# Patient Record
Sex: FEMALE
[De-identification: names, ages, dates, MRNs, and addresses within clinical notes are randomized per-mention and may not be internally consistent; named-entity substitution may affect disease eponyms.]

---

## 2019-06-18 ENCOUNTER — APPOINTMENT (OUTPATIENT)
Dept: NEUROSURGERY | Facility: CLINIC | Age: 54
End: 2019-06-18
Payer: COMMERCIAL

## 2019-06-18 DIAGNOSIS — G90.59 COMPLEX REGIONAL PAIN SYNDROME I OF OTHER SPECIFIED SITE: ICD-10-CM

## 2019-06-18 PROBLEM — Z00.00 ENCOUNTER FOR PREVENTIVE HEALTH EXAMINATION: Status: ACTIVE | Noted: 2019-06-18

## 2019-06-18 PROCEDURE — 99202 OFFICE O/P NEW SF 15 MIN: CPT

## 2019-06-19 PROBLEM — G90.59: Status: ACTIVE | Noted: 2019-06-19

## 2019-06-24 NOTE — HISTORY OF PRESENT ILLNESS
[de-identified] : This is a luis 53 year old female who more than two years ago underwent foot/ankle surgery. She subsequently developed CRPS of the left foot which severely limits her mobility. She underwent SCS trial  with Dr. rice using [employer] DRG electrodes. With the device she had over 70% improvement and was able to walk barefoot without assistive device for the first time in years. She is here to discuss permanent implantation.

## 2022-06-18 ENCOUNTER — FORM ENCOUNTER (OUTPATIENT)
Age: 57
End: 2022-06-18

## 2022-07-01 ENCOUNTER — APPOINTMENT (OUTPATIENT)
Dept: ORTHOPEDIC SURGERY | Facility: CLINIC | Age: 57
End: 2022-07-01

## 2022-07-01 VITALS
HEART RATE: 71 BPM | WEIGHT: 187 LBS | DIASTOLIC BLOOD PRESSURE: 78 MMHG | HEIGHT: 68 IN | OXYGEN SATURATION: 98 % | SYSTOLIC BLOOD PRESSURE: 140 MMHG | BODY MASS INDEX: 28.34 KG/M2

## 2022-07-01 DIAGNOSIS — R22.41 LOCALIZED SWELLING, MASS AND LUMP, RIGHT LOWER LIMB: ICD-10-CM

## 2022-07-01 PROCEDURE — 99243 OFF/OP CNSLTJ NEW/EST LOW 30: CPT

## 2022-07-01 NOTE — REVIEW OF SYSTEMS
[Feeling Tired] : not feeling tired [Joint Pain] : joint pain [Joint Stiffness] : no joint stiffness [Joint Swelling] : joint swelling

## 2022-07-01 NOTE — PHYSICAL EXAM
[FreeTextEntry1] : On exam the patient stands in good balance.  Her right ankle has full range of motion without any limitation or pain.  She has a 2 and half by 2 cm mass just under the fibula on the lateral heel.  It is minimally mobile.  It is nontender.  It is rubbery.  There is no Tinel's thrills or bruits.  She does not have any palpable mass on the medial side.  She has no posterior tibial or inguinal adenopathy.  She is neurovascularly intact. [General Appearance - Well-Appearing] : Well appearing [General Appearance - Well Nourished] : well nourished [Oriented To Time, Place, And Person] : Oriented to person, place, and time [Impaired Insight] : Insight and judgment were intact [Affect] : The affect was normal. [Mood] : the mood was normal [Sclera] : the sclera and conjunctiva were normal [Neck Cervical Mass (___cm)] : no neck mass was observed [Heart Rate And Rhythm] : heart rate was normal and rhythm regular [] : No respiratory distress [Abdomen Soft] : Soft [Normal Station and Gait] : gait and station were normal [Tenderness] : no tenderness [Swelling] : swelling [Masses] : masses [Skin Changes - Describe changes:] : No skin changes noted [Full ROM Unless otherwise noted:] : Full range of motion unless otherwise noted: [LE  Motor Strength Normal unless otherwise noted:] : 5/5 strength in bilateral lower extemities unless otherwise noted. [Normal] : Sensation intact to light touch.

## 2022-07-01 NOTE — DATA REVIEWED
[Imaging Present] : Present [de-identified] : MRI scan from June 19, 2022 shows:\par IMPRESSION: MRI of the right ankle demonstrates: \par 1.  Interval increase in the size of an encapsulated fatty mass along the inferolateral aspect of the peroneus longus tendon at the level of the anterior process calcaneus. 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  Mild to moderate plantar fasciopathy without tear.

## 2022-07-01 NOTE — CONSULT LETTER
[Dear  ___] : Dear  [unfilled], [FreeTextEntry2] : BRIDGET CRUZ MD\par [address] [place]par [place] [id number] [FreeTextEntry1] : \par After evaluating your patient and reviewing the studies presented we have come to a mutually agreeable plan. \par \par Please see my note below.\par \par Should you have further questions, please feel free to call and discuss the care of your patient.\par \par Thank you for the confidence of your referral.\par \par Sincerely, \par \par Sukhi Caban MD \par Chief, Musculoskeletal Oncology \par \par [id number]\par [phone]

## 2022-07-01 NOTE — DISCUSSION/SUMMARY
[Surgical risks reviewed] : Surgical risks reviewed [All Questions Answered] : Patient (and family) had all questions answered to an agreeable level of satisfaction [Interested in Proceeding] : Patient (and family) expressed understanding and interest in proceeding with the plan as outlined [de-identified] : Patient has a fatty lesion consistent with either lipoma or dystrophic fat in the area.  He does not a simple encapsulated mass but I think this is more behind the fascial constraints of the area.  We discussed that she can have resection for symptoms however based on the fact that she has no pain and it has been there for 2 years with no change I can leave this alone.  She can come back in 6 months just to watch the size otherwise we can take it out as needed.  She is free to do all activities.\par \par If imaging was ordered, the patient was told to make an appointment to review findings right after all imaging is completed.\par \par We discussed risks, benefits and alternatives. Rationale of care was reviewed and all questions were answered. Patient (and family) had all questions answered to her degree of the level of satisfaction. Patient (and family) expressed understanding and interest in proceeding with the plan as outlined.\par \par \par \par \par This note was done with a voice recognition transcription software and any typos are related to this rather than medical error. Surgical risks reviewed. Patient (and family) had all questions answered to an agreeable level of satisfaction. Patient (and family) expressed understanding and interest in proceeding with the plan as outlined.  \par

## 2022-07-01 NOTE — HISTORY OF PRESENT ILLNESS
[FreeTextEntry1] : This is a 56-year-old female who is seeing other orthopedist for knee pain and back pain has a history of complex regional pain syndrome who noticed a lump on her lateral right ankle 2 years ago.  She does not think it is gotten much bigger.  She just recently had an MRI scan and was sent to me for further evaluation.  It does not cause pain.  It does not affect her shoe wear unless she is wearing very thick socks. there is no pain. [Stable] : stable [0] : currently ~his/her~ pain is 0 out of 10 [Bending] : not exacerbated by bending [Direct Pressure] : worsened by direct pressure [None] : No relieving factors are noted

## 2023-02-24 ENCOUNTER — APPOINTMENT (OUTPATIENT)
Dept: ORTHOPEDIC SURGERY | Facility: CLINIC | Age: 58
End: 2023-02-24